# Patient Record
Sex: MALE | Race: WHITE | Employment: FULL TIME
[De-identification: names, ages, dates, MRNs, and addresses within clinical notes are randomized per-mention and may not be internally consistent; named-entity substitution may affect disease eponyms.]

---

## 2017-01-31 ENCOUNTER — OFFICE VISIT (OUTPATIENT)
Dept: BURN CARE | Facility: CLINIC | Age: 62
End: 2017-01-31

## 2017-01-31 VITALS
WEIGHT: 190 LBS | BODY MASS INDEX: 28.14 KG/M2 | TEMPERATURE: 98.8 F | SYSTOLIC BLOOD PRESSURE: 145 MMHG | DIASTOLIC BLOOD PRESSURE: 89 MMHG | HEART RATE: 92 BPM | HEIGHT: 69 IN

## 2017-01-31 DIAGNOSIS — T25.221A PARTIAL THICKNESS BURN OF RIGHT FOOT: Primary | ICD-10-CM

## 2017-01-31 PROCEDURE — G8484 FLU IMMUNIZE NO ADMIN: HCPCS | Performed by: PLASTIC SURGERY

## 2017-01-31 PROCEDURE — 16025 DRESS/DEBRID P-THICK BURN M: CPT | Performed by: PLASTIC SURGERY

## 2017-01-31 PROCEDURE — G8419 CALC BMI OUT NRM PARAM NOF/U: HCPCS | Performed by: PLASTIC SURGERY

## 2017-01-31 PROCEDURE — 99202 OFFICE O/P NEW SF 15 MIN: CPT | Performed by: PLASTIC SURGERY

## 2017-01-31 PROCEDURE — 3017F COLORECTAL CA SCREEN DOC REV: CPT | Performed by: PLASTIC SURGERY

## 2017-01-31 PROCEDURE — 1036F TOBACCO NON-USER: CPT | Performed by: PLASTIC SURGERY

## 2017-01-31 PROCEDURE — G8427 DOCREV CUR MEDS BY ELIG CLIN: HCPCS | Performed by: PLASTIC SURGERY

## 2017-01-31 RX ORDER — IBUPROFEN 200 MG
TABLET ORAL 2 TIMES DAILY
COMMUNITY
End: 2017-02-14 | Stop reason: ALTCHOICE

## 2017-02-14 ENCOUNTER — OFFICE VISIT (OUTPATIENT)
Dept: BURN CARE | Facility: CLINIC | Age: 62
End: 2017-02-14

## 2017-02-14 VITALS
WEIGHT: 192 LBS | HEART RATE: 80 BPM | TEMPERATURE: 98.4 F | HEIGHT: 68 IN | SYSTOLIC BLOOD PRESSURE: 137 MMHG | BODY MASS INDEX: 29.1 KG/M2 | DIASTOLIC BLOOD PRESSURE: 88 MMHG

## 2017-02-14 DIAGNOSIS — T25.221S BURN OF RIGHT FOOT, SECOND DEGREE, SEQUELA: Primary | ICD-10-CM

## 2017-02-14 PROBLEM — T25.021A BURN OF RIGHT FOOT: Status: ACTIVE | Noted: 2017-02-14

## 2017-02-14 PROCEDURE — 16025 DRESS/DEBRID P-THICK BURN M: CPT | Performed by: PLASTIC SURGERY

## 2017-02-14 PROCEDURE — G8419 CALC BMI OUT NRM PARAM NOF/U: HCPCS | Performed by: PLASTIC SURGERY

## 2017-02-14 PROCEDURE — 1036F TOBACCO NON-USER: CPT | Performed by: PLASTIC SURGERY

## 2017-02-14 PROCEDURE — 99213 OFFICE O/P EST LOW 20 MIN: CPT | Performed by: PLASTIC SURGERY

## 2017-02-14 PROCEDURE — G8427 DOCREV CUR MEDS BY ELIG CLIN: HCPCS | Performed by: PLASTIC SURGERY

## 2017-02-14 PROCEDURE — 3017F COLORECTAL CA SCREEN DOC REV: CPT | Performed by: PLASTIC SURGERY

## 2017-02-14 PROCEDURE — G8484 FLU IMMUNIZE NO ADMIN: HCPCS | Performed by: PLASTIC SURGERY

## 2017-02-14 RX ORDER — GINSENG 100 MG
CAPSULE ORAL 2 TIMES DAILY
COMMUNITY

## 2017-02-16 ENCOUNTER — TELEPHONE (OUTPATIENT)
Dept: BURN CARE | Facility: CLINIC | Age: 62
End: 2017-02-16

## 2017-03-07 ENCOUNTER — OFFICE VISIT (OUTPATIENT)
Dept: BURN CARE | Facility: CLINIC | Age: 62
End: 2017-03-07

## 2017-03-07 VITALS
BODY MASS INDEX: 29.83 KG/M2 | TEMPERATURE: 98.4 F | SYSTOLIC BLOOD PRESSURE: 143 MMHG | WEIGHT: 196.8 LBS | HEART RATE: 76 BPM | HEIGHT: 68 IN | DIASTOLIC BLOOD PRESSURE: 98 MMHG

## 2017-03-07 DIAGNOSIS — T25.221A PARTIAL THICKNESS BURN OF RIGHT FOOT: Primary | ICD-10-CM

## 2017-03-07 PROCEDURE — G8484 FLU IMMUNIZE NO ADMIN: HCPCS | Performed by: PLASTIC SURGERY

## 2017-03-07 PROCEDURE — 3017F COLORECTAL CA SCREEN DOC REV: CPT | Performed by: PLASTIC SURGERY

## 2017-03-07 PROCEDURE — G8419 CALC BMI OUT NRM PARAM NOF/U: HCPCS | Performed by: PLASTIC SURGERY

## 2017-03-07 PROCEDURE — 1036F TOBACCO NON-USER: CPT | Performed by: PLASTIC SURGERY

## 2017-03-07 PROCEDURE — 99212 OFFICE O/P EST SF 10 MIN: CPT | Performed by: PLASTIC SURGERY

## 2017-03-07 PROCEDURE — G8427 DOCREV CUR MEDS BY ELIG CLIN: HCPCS | Performed by: PLASTIC SURGERY

## 2024-08-06 RX ORDER — LANOLIN ALCOHOL/MO/W.PET/CERES
400 CREAM (GRAM) TOPICAL DAILY
COMMUNITY

## 2024-08-06 RX ORDER — ASPIRIN 81 MG/1
81 TABLET ORAL DAILY
COMMUNITY

## 2024-08-06 RX ORDER — AMLODIPINE BESYLATE 10 MG/1
10 TABLET ORAL DAILY
COMMUNITY

## 2024-08-06 RX ORDER — HYDRALAZINE HYDROCHLORIDE 100 MG/1
100 TABLET, FILM COATED ORAL 3 TIMES DAILY
COMMUNITY

## 2024-08-06 NOTE — PROGRESS NOTES
In preparation for their surgical procedure above patient was screened for Obstructive Sleep Apnea (RUDOLPH) using the STOP-Bang Questionnaire by the Pre-Admission Testing department.  This is a pre-surgical screening tool for patient safety and serves as a recommendation, this WILL NOT cause cancellation of surgery.    STOP-Bang Questionnaire  * Do you currently see a pulmonologist?  No     If yes STOP, do not complete.  Patient follows with Dr.     1.  Do you snore loudly (able to be heard in the next room)?      No    2.  Do you often feel tired or sleepy during the daytime?          No       3.  Has anyone ever told you that you stop breathing during your sleep?       No    4.  Do you have or are you being treated for high blood pressure?          Yes      5.  BMI more than 35?  BMI (Calculated): 30.5        No    6.  Age over 50 years? 69 y.o.      Yes    7.  Neck Circumference greater than 17 inches for male or 16 inches for female?  Measured           (visits only)            Not Applicable    8.  Gender Male?                 Yes      TOTAL SCORE: 3    RUDOLPH - Low Risk : Yes to 0 - 2 questions  RUDOLPH - Intermediate Risk : Yes to 3 - 4 questions  RUDOLPH - High Risk : Yes to 5 - 8 questions    Adapted from:   STOP Questionnaire: A Tool to Screen Patients for Obstructive Sleep Apnea   MERRILL Hardy.R.C.P.C., Milton Romano M.B.B.S., Harpal Alexander M.D., Millicent Snow, Ph.D., GERMAINE Penn.B.B.S., GERMAINE Baxter.Sc., Tracie Padilla M.D., Víctor Ricks F.R.C.P.C.   Anesthesiology 2008; 108:812-21 Copyright 2008, the American Society of Anesthesiologists, Inc. [employer]   ----------------------------------------------------------------------------------------------------------------

## 2024-08-06 NOTE — PROGRESS NOTES
NPO after midnight  Bring eye bag from office  Bring insurance info and drivers license  Wear comfortable clean clothing, button down top  Do not bring jewelry  Shower night before and morning of surgery with a liquid antibacterial soap  Bring list of medications with dosage and how often taken  Follow all instructions given by your physician   needed at discharge  Call [phone] for any questions

## 2024-08-09 NOTE — DISCHARGE INSTRUCTIONS
Cataract Post-Operative Care Instructions     How to Instill you Eye Drops:    Wash your hands before instilling drops   Shake eye drop bottle putting one drop in the surgical eye   The dropper tip should not touch the eyelashes or the eye.  Your head should be tilted back, look up and lower the eyelid pulled down from the cheekbone to form a pocket for the eye drop.       Daily Eye Drop Schedule:    Remove the eye patch as directed by the recovery room nurse.  Apply 1 drop of Ofloxacin three times today, then four times a day starting tomorrow for one week.  Lay a warm, clean and moist washcloth for 5 minutes on operative eye.   Apply 1 drop of Lotemax two times today, and then starting tomorrow three times a day for one week, two times a day for one week and then once a day for one week.  Lay a warm, clean and moist washcloth for 5 minutes on operative eye.    Apply 1 drop of Prolensa one time a day starting tomorrow for two weeks.  Lay a warm, clean and moist washcloth for 5 minutes on operative eye.      Care at Home:     Wear a shield at bedtime for one week following your eye surgery.   NEVER RUB YOUR OPERATIVE EYE!  Use of the operative eye is NOT harmful.   Your vision may be blurry with your present glasses.  Take your glasses to your follow-up appointment the day after surgery.  You will receive your new glasses prescription approximately 4-5 weeks following surgery.    You may do everything to care for yourself.   You may sleep on your back or either side after surgery, but NOT FACE DOWN ON YOUR STOMACH.    NO LIFTING OVER 10 POUNDS.   No outdoor work until your doctor tells you that it is OK.   Light work, including stooping over is not harmful.   If you have glaucoma, continue your normal use of the glaucoma drops.   Do not submerge head under water (Hot tub/swimming pool)    Please call office if any problem arise at [phone] [phone].    I have had the opportunity to ask questions and have

## 2024-08-12 ENCOUNTER — HOSPITAL ENCOUNTER (OUTPATIENT)
Age: 69
Setting detail: OUTPATIENT SURGERY
Discharge: HOME | End: 2024-08-12
Attending: OPHTHALMOLOGY | Admitting: OPHTHALMOLOGY
Payer: COMMERCIAL

## 2024-08-12 ENCOUNTER — ANESTHESIA (OUTPATIENT)
Dept: OPERATING ROOM | Age: 69
End: 2024-08-12
Payer: COMMERCIAL

## 2024-08-12 ENCOUNTER — ANESTHESIA EVENT (OUTPATIENT)
Dept: OPERATING ROOM | Age: 69
End: 2024-08-12
Payer: COMMERCIAL

## 2024-08-12 VITALS
OXYGEN SATURATION: 95 % | DIASTOLIC BLOOD PRESSURE: 73 MMHG | BODY MASS INDEX: 30.31 KG/M2 | SYSTOLIC BLOOD PRESSURE: 142 MMHG | HEIGHT: 68 IN | TEMPERATURE: 98 F | RESPIRATION RATE: 16 BRPM | WEIGHT: 200 LBS | HEART RATE: 65 BPM

## 2024-08-12 LAB — GLUCOSE BLD STRIP.AUTO-MCNC: 155 MG/DL (ref 70–108)

## 2024-08-12 PROCEDURE — 82948 REAGENT STRIP/BLOOD GLUCOSE: CPT

## 2024-08-12 PROCEDURE — 2500000003 HC RX 250 WO HCPCS: Performed by: OPHTHALMOLOGY

## 2024-08-12 PROCEDURE — 2580000003 HC RX 258: Performed by: OPHTHALMOLOGY

## 2024-08-12 PROCEDURE — 7100000011 HC PHASE II RECOVERY - ADDTL 15 MIN: Performed by: OPHTHALMOLOGY

## 2024-08-12 PROCEDURE — 3700000000 HC ANESTHESIA ATTENDED CARE: Performed by: OPHTHALMOLOGY

## 2024-08-12 PROCEDURE — 6370000000 HC RX 637 (ALT 250 FOR IP): Performed by: OPHTHALMOLOGY

## 2024-08-12 PROCEDURE — 2709999900 HC NON-CHARGEABLE SUPPLY: Performed by: OPHTHALMOLOGY

## 2024-08-12 PROCEDURE — 6360000002 HC RX W HCPCS: Performed by: OPHTHALMOLOGY

## 2024-08-12 PROCEDURE — 3700000001 HC ADD 15 MINUTES (ANESTHESIA): Performed by: OPHTHALMOLOGY

## 2024-08-12 PROCEDURE — V2632 POST CHMBR INTRAOCULAR LENS: HCPCS | Performed by: OPHTHALMOLOGY

## 2024-08-12 PROCEDURE — 7100000010 HC PHASE II RECOVERY - FIRST 15 MIN: Performed by: OPHTHALMOLOGY

## 2024-08-12 PROCEDURE — 3600000003 HC SURGERY LEVEL 3 BASE: Performed by: OPHTHALMOLOGY

## 2024-08-12 PROCEDURE — 3600000013 HC SURGERY LEVEL 3 ADDTL 15MIN: Performed by: OPHTHALMOLOGY

## 2024-08-12 PROCEDURE — 2720000010 HC SURG SUPPLY STERILE: Performed by: OPHTHALMOLOGY

## 2024-08-12 DEVICE — ACRYSOF(R) IQ ASPHERIC NATURAL IOL, SINGLE-PIECE ACRYLIC FOLDABLE PCL, UV WITH BLUE LIGHTFILTER, 13.0MM LENGTH, 6.0MM ANTERIORASYMMETRIC BICONVEX OPTIC, PLANAR HAPTICS.
Type: IMPLANTABLE DEVICE | Site: EYE | Status: FUNCTIONAL
Brand: ACRYSOF®

## 2024-08-12 RX ORDER — SODIUM CHLORIDE 9 MG/ML
INJECTION, SOLUTION INTRAVENOUS CONTINUOUS
Status: DISCONTINUED | OUTPATIENT
Start: 2024-08-12 | End: 2024-08-12 | Stop reason: HOSPADM

## 2024-08-12 RX ORDER — BUPIVACAINE HYDROCHLORIDE 7.5 MG/ML
1 INJECTION, SOLUTION EPIDURAL; RETROBULBAR EVERY 5 MIN PRN
Status: DISCONTINUED | OUTPATIENT
Start: 2024-08-12 | End: 2024-08-12 | Stop reason: HOSPADM

## 2024-08-12 RX ORDER — LIDOCAINE HYDROCHLORIDE 10 MG/ML
INJECTION, SOLUTION EPIDURAL; INFILTRATION; INTRACAUDAL; PERINEURAL PRN
Status: DISCONTINUED | OUTPATIENT
Start: 2024-08-12 | End: 2024-08-12 | Stop reason: ALTCHOICE

## 2024-08-12 RX ADMIN — Medication 1 DROP: at 10:45

## 2024-08-12 RX ADMIN — SODIUM CHLORIDE: 9 INJECTION, SOLUTION INTRAVENOUS at 11:05

## 2024-08-12 RX ADMIN — BUPIVACAINE HYDROCHLORIDE 0.38 MG: 7.5 INJECTION, SOLUTION EPIDURAL; RETROBULBAR at 10:50

## 2024-08-12 RX ADMIN — BUPIVACAINE HYDROCHLORIDE 0.38 MG: 7.5 INJECTION, SOLUTION EPIDURAL; RETROBULBAR at 10:45

## 2024-08-12 RX ADMIN — Medication 1 DROP: at 10:50

## 2024-08-12 ASSESSMENT — PAIN - FUNCTIONAL ASSESSMENT
PAIN_FUNCTIONAL_ASSESSMENT: NONE - DENIES PAIN
PAIN_FUNCTIONAL_ASSESSMENT: 0-10

## 2024-08-12 NOTE — OP NOTE
[facility]  RECORD OF OPERATION                       2024    Patient: Gaudencio Roberts  : 1955  Acct#: [id number]    PRE-OPERATIVE DIAGNOSIS:  Cataract, OD    POST-OPERATIVE DIAGNOSIS:  same    OPERATION PERFORMED:  Phacoemulsification cataract extraction with posterior chamber intraocular lens, OD    MODIFIERS: None    SURGEON:  Jun Rivera MD    ANESTHESIA:  Topical/MAC    COMPLICATIONS:  None    LENS INFORMATION:SN60WF +16.0 (SN:[id number])    CDE: 3.28    INDICATION AND CONSENT:  The patient was found to have a visually significant cataract affecting their activities of daily living which is not adequately correctable by a change in spectacles.  The risks and options of cataract surgery including observation were discussed.  Consent was obtained and the patient requests to proceed.    OPERATIVE TECHNIQUE: In the preoperative area, the patient was prepared for surgery including dilation of the operative eye.  The patient was then taken to the operating room, the operative eye was prepped and draped in the usual sterile ophthalmic fashion.  A final timeout was performed to confirm the correct patient, site, side, lens, and procedure.  A lid speculum was inserted.  A paracentesis incision was made at the limbus in a position comfortable for the non-dominate hand.  0.2-0.4cc of 1% lidocaine was instilled into the anterior chamber followed by 0.2-0.4cc of Omidria. Viscoelastic was instilled into the anterior chamber. A keratome was used to produce a clear corneal incision at the temporal limbus.  A capsulorrhexis-type capsulotomy was performed.  Hydrodissection was performed balanced salt solution (BSS).  The lens nucleus was phacoemulsified. The lens cortical material was aspirated using the automated irrigation/aspiration unit (I/A). Additional viscoelastic was instilled into the anterior segment and capsular bag. An intraocular lens was introduced and centered

## 2024-08-12 NOTE — PROGRESS NOTES
1118-Patient in Phase II. Patient is awake and denies any pain or nausea. Eye patch in place. Family at bedside. Vital signs stable    1122-snack and drink provided.    1130-IV removed at this time. Pt getting dressed    1140-discharge instructions provided to the patient and his wife. Voiced understanding.     1145-PT discharged home in stable condition. All belongings sent. Walked out with staff and home with family.

## 2024-08-12 NOTE — ANESTHESIA POSTPROCEDURE EVALUATION
Department of Anesthesiology  Postprocedure Note    Patient: Gaudencio Roberts  MRN: [id number]  YOB: 1955  Date of evaluation: 8/12/2024    Procedure Summary       Date: 08/12/24 Room / Location: [address]    Anesthesia Start: 1104 Anesthesia Stop: 1114    Procedure: Right Phacoemulsification with IOL (Right: Eye) Diagnosis:       Combined forms of age-related cataract of right eye      (Combined forms of age-related cataract of right eye [H25.811])    Surgeons: Jun Rivera MD Responsible Provider: Alberto Cox DO    Anesthesia Type: MAC ASA Status: 3            Anesthesia Type: No value filed.    Lucretia Phase I:      Lucretia Phase II: Lucretia Score: 9    Anesthesia Post Evaluation    Patient location during evaluation: bedside  Patient participation: complete - patient participated  Level of consciousness: awake and alert  Pain score: 0  Airway patency: patent  Nausea & Vomiting: no nausea and no vomiting  Cardiovascular status: hemodynamically stable and blood pressure returned to baseline  Respiratory status: spontaneous ventilation, room air and acceptable  Hydration status: stable  Pain management: adequate and satisfactory to patient    No notable events documented.

## 2024-08-12 NOTE — PROGRESS NOTES
1 DROP PROPARACAINE TO RIGHT EYE PRIOR TO PREP     CDE 3.28     1 DROP LOTEMAX AND 1 DROP MOXIFLOXACIN TO RIGHT EYE     0.1ML VIGAMOX TO RIGHT EYE     EYE SHIELD SECURED OVER RIGHT EYE WITH TAPE

## 2024-08-12 NOTE — H&P
I have examined the patient and reviewed the H&P / Consult and there are no changes to the patient.    Jun Rivera MD 8/12/202410:51 AM

## 2024-08-12 NOTE — ANESTHESIA PRE PROCEDURE
Department of Anesthesiology  Preprocedure Note       Name:  Gaudencio Roberts   Age:  69 y.o.  :  1955                                          MRN:  [id number]         Date:  2024      Surgeon: Surgeon(s):  Jun Rivera MD Deason, Erich K, APRN - NAHOMI    Procedure: Procedure(s):  Right Phacoemulsification with IOL    Medications prior to admission:   Prior to Admission medications    Medication Sig Start Date End Date Taking? Authorizing Provider   hydrALAZINE (APRESOLINE) 100 MG tablet Take 1 tablet by mouth 3 times daily   Yes Ilana Delaney MD   amLODIPine (NORVASC) 10 MG tablet Take 1 tablet by mouth daily   Yes ProviderIlana MD   folic acid (FOLVITE) 400 MCG tablet Take 1 tablet by mouth daily   Yes ProviderIlana MD   aspirin 81 MG EC tablet Take 1 tablet by mouth daily   Yes ProviderIlana MD   ZINC PO Take by mouth daily   Yes ProviderIlana MD   Cyanocobalamin (VITAMIN B-12 PO) Take by mouth daily   Yes ProviderIlana MD       Current medications:    Current Facility-Administered Medications   Medication Dose Route Frequency Provider Last Rate Last Admin   • BUPivacaine (PF) (MARCAINE) 0.75 % injection 0.375 mg  1 drop Ophthalmic Q5 Min PRN Jun Rivera MD   0.375 mg at 24 1045   • Tropic-Cyclopent-PE-Ketorolac 1-1-2.5-0.5 % SOLN 1 drop  1 drop Right Eye Q5 Min PRN Jun Rivera MD   1 drop at 24 1045   • 0.9 % sodium chloride infusion   IntraVENous Continuous Jun Rivera MD       • balanced salts (BSS) 500 mL    PRN Jun Rivera MD   225 mL at 24 1037   • lidocaine PF 1 % injection    PRN Jun Rivera MD   0.2 mL at 24 1037   • Phenylephrine-Ketorolac 1-0.3 % SOLN    PRN Jun Rivera MD   0.2 mL at 24 1038   • sod hyaluronate-sod chondroitin-sod hyaluronate (DUOVISC) intra-ocular kit    PRN Jun Rivera MD   1.05 mL at 24 1038       Allergies:  No Known Allergies    Problem List:    Patient Active

## 2024-08-19 NOTE — PROGRESS NOTES
NPO after midnight  Bring eye bag from office  Bring insurance info and drivers license  Wear comfortable clean clothing, button down top  Do not bring jewelry  Shower night before and morning of surgery with a liquid antibacterial soap  Bring list of medications with dosage and how often taken  Follow all instructions given by your physician   needed at discharge  Call [phone] for any questions

## 2024-08-22 NOTE — DISCHARGE INSTRUCTIONS
Cataract Post-Operative Care Instructions     How to Instill you Eye Drops:    Wash your hands before instilling drops   Shake eye drop bottle putting one drop in the surgical eye   The dropper tip should not touch the eyelashes or the eye.  Your head should be tilted back, look up and lower the eyelid pulled down from the cheekbone to form a pocket for the eye drop.       Daily Eye Drop Schedule:    Remove the eye patch as directed by the recovery room nurse.  Apply 1 drop of Ofloxacin three times today, then four times a day starting tomorrow for one week.  Lay a warm, clean and moist washcloth for 5 minutes on operative eye.   Apply 1 drop of Lotemax two times today, and then starting tomorrow three times a day for one week, two times a day for one week and then once a day for one week.  Lay a warm, clean and moist washcloth for 5 minutes on operative eye.    Apply 1 drop of Prolensa one time a day starting tomorrow for two weeks.  Lay a warm, clean and moist washcloth for 5 minutes on operative eye.      Care at Home:     Wear a shield at bedtime for one week following your eye surgery.   NEVER RUB YOUR OPERATIVE EYE!  Use of the operative eye is NOT harmful.   Your vision may be blurry with your present glasses.  Take your glasses to your follow-up appointment the day after surgery.  You will receive your new glasses prescription approximately 4-5 weeks following surgery.    You may do everything to care for yourself.   You may sleep on your back or either side after surgery, but NOT FACE DOWN ON YOUR STOMACH.    NO LIFTING OVER 10 POUNDS.   No outdoor work until your doctor tells you that it is OK.   Light work, including stooping over is not harmful.   If you have glaucoma, continue your normal use of the glaucoma drops.   Do not submerge head under water (Hot tub/swimming pool)    Please call office if any problem arise at [phone] [phone].    I have had the opportunity to ask questions and have

## 2024-08-26 ENCOUNTER — ANESTHESIA EVENT (OUTPATIENT)
Dept: OPERATING ROOM | Age: 69
End: 2024-08-26
Payer: COMMERCIAL

## 2024-08-26 ENCOUNTER — HOSPITAL ENCOUNTER (OUTPATIENT)
Age: 69
Setting detail: OUTPATIENT SURGERY
Discharge: HOME | End: 2024-08-26
Attending: OPHTHALMOLOGY | Admitting: OPHTHALMOLOGY
Payer: COMMERCIAL

## 2024-08-26 ENCOUNTER — ANESTHESIA (OUTPATIENT)
Dept: OPERATING ROOM | Age: 69
End: 2024-08-26
Payer: COMMERCIAL

## 2024-08-26 VITALS
TEMPERATURE: 96.5 F | RESPIRATION RATE: 16 BRPM | BODY MASS INDEX: 29.04 KG/M2 | HEART RATE: 65 BPM | DIASTOLIC BLOOD PRESSURE: 81 MMHG | WEIGHT: 191.6 LBS | HEIGHT: 68 IN | SYSTOLIC BLOOD PRESSURE: 172 MMHG | OXYGEN SATURATION: 97 %

## 2024-08-26 PROCEDURE — V2632 POST CHMBR INTRAOCULAR LENS: HCPCS | Performed by: OPHTHALMOLOGY

## 2024-08-26 PROCEDURE — 6360000002 HC RX W HCPCS: Performed by: OPHTHALMOLOGY

## 2024-08-26 PROCEDURE — 6370000000 HC RX 637 (ALT 250 FOR IP): Performed by: OPHTHALMOLOGY

## 2024-08-26 PROCEDURE — 7100000011 HC PHASE II RECOVERY - ADDTL 15 MIN: Performed by: OPHTHALMOLOGY

## 2024-08-26 PROCEDURE — 3600000013 HC SURGERY LEVEL 3 ADDTL 15MIN: Performed by: OPHTHALMOLOGY

## 2024-08-26 PROCEDURE — 6360000002 HC RX W HCPCS

## 2024-08-26 PROCEDURE — 2580000003 HC RX 258: Performed by: OPHTHALMOLOGY

## 2024-08-26 PROCEDURE — 7100000010 HC PHASE II RECOVERY - FIRST 15 MIN: Performed by: OPHTHALMOLOGY

## 2024-08-26 PROCEDURE — 3600000003 HC SURGERY LEVEL 3 BASE: Performed by: OPHTHALMOLOGY

## 2024-08-26 PROCEDURE — 2720000010 HC SURG SUPPLY STERILE: Performed by: OPHTHALMOLOGY

## 2024-08-26 PROCEDURE — 2500000003 HC RX 250 WO HCPCS: Performed by: OPHTHALMOLOGY

## 2024-08-26 PROCEDURE — 3700000000 HC ANESTHESIA ATTENDED CARE: Performed by: OPHTHALMOLOGY

## 2024-08-26 PROCEDURE — 3700000001 HC ADD 15 MINUTES (ANESTHESIA): Performed by: OPHTHALMOLOGY

## 2024-08-26 PROCEDURE — 2709999900 HC NON-CHARGEABLE SUPPLY: Performed by: OPHTHALMOLOGY

## 2024-08-26 DEVICE — ACRYSOF(R) IQ ASPHERIC NATURAL IOL, SINGLE-PIECE ACRYLIC FOLDABLE PCL, UV WITH BLUE LIGHTFILTER, 13.0MM LENGTH, 6.0MM ANTERIORASYMMETRIC BICONVEX OPTIC, PLANAR HAPTICS.
Type: IMPLANTABLE DEVICE | Site: EYE | Status: FUNCTIONAL
Brand: ACRYSOF®

## 2024-08-26 RX ORDER — FENTANYL CITRATE 50 UG/ML
INJECTION, SOLUTION INTRAMUSCULAR; INTRAVENOUS PRN
Status: DISCONTINUED | OUTPATIENT
Start: 2024-08-26 | End: 2024-08-26 | Stop reason: SDUPTHER

## 2024-08-26 RX ORDER — BUPIVACAINE HYDROCHLORIDE 7.5 MG/ML
1 INJECTION, SOLUTION EPIDURAL; RETROBULBAR EVERY 5 MIN PRN
Status: COMPLETED | OUTPATIENT
Start: 2024-08-26 | End: 2024-08-26

## 2024-08-26 RX ORDER — BALANCED SALT SOLUTION ENRICHED WITH BICARBONATE, DEXTROSE, AND GLUTATHIONE
KIT INTRAOCULAR PRN
Status: DISCONTINUED | OUTPATIENT
Start: 2024-08-26 | End: 2024-08-26 | Stop reason: ALTCHOICE

## 2024-08-26 RX ORDER — MIDAZOLAM HYDROCHLORIDE 1 MG/ML
INJECTION INTRAMUSCULAR; INTRAVENOUS PRN
Status: DISCONTINUED | OUTPATIENT
Start: 2024-08-26 | End: 2024-08-26 | Stop reason: SDUPTHER

## 2024-08-26 RX ORDER — LIDOCAINE HYDROCHLORIDE 10 MG/ML
INJECTION, SOLUTION EPIDURAL; INFILTRATION; INTRACAUDAL; PERINEURAL PRN
Status: DISCONTINUED | OUTPATIENT
Start: 2024-08-26 | End: 2024-08-26 | Stop reason: ALTCHOICE

## 2024-08-26 RX ORDER — SODIUM CHLORIDE 9 MG/ML
INJECTION, SOLUTION INTRAVENOUS CONTINUOUS
Status: DISCONTINUED | OUTPATIENT
Start: 2024-08-26 | End: 2024-08-26 | Stop reason: HOSPADM

## 2024-08-26 RX ADMIN — BUPIVACAINE HYDROCHLORIDE 0.38 MG: 7.5 INJECTION, SOLUTION EPIDURAL; RETROBULBAR at 09:46

## 2024-08-26 RX ADMIN — Medication 1 DROP: at 09:40

## 2024-08-26 RX ADMIN — SODIUM CHLORIDE: 9 INJECTION, SOLUTION INTRAVENOUS at 10:34

## 2024-08-26 RX ADMIN — Medication 1 DROP: at 09:50

## 2024-08-26 RX ADMIN — BUPIVACAINE HYDROCHLORIDE 0.38 MG: 7.5 INJECTION, SOLUTION EPIDURAL; RETROBULBAR at 09:40

## 2024-08-26 RX ADMIN — MIDAZOLAM 2 MG: 1 INJECTION INTRAMUSCULAR; INTRAVENOUS at 10:38

## 2024-08-26 RX ADMIN — FENTANYL CITRATE 25 MCG: 50 INJECTION, SOLUTION INTRAMUSCULAR; INTRAVENOUS at 10:38

## 2024-08-26 RX ADMIN — Medication 1 DROP: at 09:46

## 2024-08-26 RX ADMIN — BUPIVACAINE HYDROCHLORIDE 0.38 MG: 7.5 INJECTION, SOLUTION EPIDURAL; RETROBULBAR at 09:50

## 2024-08-26 ASSESSMENT — PAIN - FUNCTIONAL ASSESSMENT
PAIN_FUNCTIONAL_ASSESSMENT: NONE - DENIES PAIN
PAIN_FUNCTIONAL_ASSESSMENT: 0-10
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
## 2024-08-26 NOTE — OP NOTE
[facility]  RECORD OF OPERATION                       2024    Patient: Gaudencio Roberts  : 1955  Acct#: [id number]    PRE-OPERATIVE DIAGNOSIS:  Cataract, OS    POST-OPERATIVE DIAGNOSIS:  same    OPERATION PERFORMED:  Phacoemulsification cataract extraction with posterior chamber intraocular lens, OS    MODIFIERS: None    SURGEON:  Jun Rivera MD    ANESTHESIA:  Topical/MAC    COMPLICATIONS:  None    LENS INFORMATION:SN60WF +15.5 (SN:[id number])    CDE: 2.08    INDICATION AND CONSENT:  The patient was found to have a visually significant cataract affecting their activities of daily living which is not adequately correctable by a change in spectacles.  The risks and options of cataract surgery including observation were discussed.  Consent was obtained and the patient requests to proceed.    OPERATIVE TECHNIQUE: In the preoperative area, the patient was prepared for surgery including dilation of the operative eye.  The patient was then taken to the operating room, the operative eye was prepped and draped in the usual sterile ophthalmic fashion.  A final timeout was performed to confirm the correct patient, site, side, lens, and procedure.  A lid speculum was inserted.  A paracentesis incision was made at the limbus in a position comfortable for the non-dominate hand.  0.2-0.4cc of 1% lidocaine was instilled into the anterior chamber followed by 0.2-0.4cc of Omidria. Viscoelastic was instilled into the anterior chamber. A keratome was used to produce a clear corneal incision at the temporal limbus.  A capsulorrhexis-type capsulotomy was performed.  Hydrodissection was performed balanced salt solution (BSS).  The lens nucleus was phacoemulsified. The lens cortical material was aspirated using the automated irrigation/aspiration unit (I/A). Additional viscoelastic was instilled into the anterior segment and capsular bag. An intraocular lens was introduced and centered

## 2024-08-26 NOTE — H&P
I have examined the patient and reviewed the H&P / Consult and there are no changes to the patient.    Jun Rivera MD 8/26/20249:53 AM

## 2024-08-26 NOTE — PROGRESS NOTES
1 gtt Proparacaine given at start of case  1 gtt Lotemax given at end of case  1 gtt Moxifloxacin given at end of case  Vigamox 4:1 mixture W/ BSS at end of case 0.1mL given

## 2024-08-26 NOTE — ANESTHESIA POSTPROCEDURE EVALUATION
Department of Anesthesiology  Postprocedure Note    Patient: Gaudencio Roberts  MRN: [id number]  YOB: 1955  Date of evaluation: 8/26/2024    Procedure Summary       Date: 08/26/24 Room / Location: [address]    Anesthesia Start: 1034 Anesthesia Stop: 1049    Procedure: Left Phacoemulsification with IOL (Left) Diagnosis:       Combined forms of age-related cataract of left eye      (Combined forms of age-related cataract of left eye [H25.812])    Surgeons: Jun Rivera MD Responsible Provider: Tadeo Otoole MD    Anesthesia Type: MAC ASA Status: 3            Anesthesia Type: No value filed.    Lucretia Phase I:      Lucretia Phase II:      Anesthesia Post Evaluation    Patient location during evaluation: bedside  Patient participation: complete - patient participated  Level of consciousness: awake  Airway patency: patent  Nausea & Vomiting: no vomiting and no nausea  Cardiovascular status: hemodynamically stable  Respiratory status: acceptable  Hydration status: stable  Pain management: adequate    No notable events documented.

## 2024-08-26 NOTE — PROGRESS NOTES
1054- Patient to Phase II via chair. Report received from OR RN. Patient drowsy but responsive.Vitals obtained and stable. Respirations even and unlabored on room air. Patient denies pain, nausea, numbness and tingling. Patient able to move all extremities. Sx site appears clean and intact. Patient instructed to stay in chair. Instructed on call light use.     1058- Pt drinking fluids at this time    1109- Pt's IV removed at this time    1112- This RN went over discharge instructions w/ the pt and Millicent (wife). Both verbalized understanding and had all questions answered at this time.    1120- Pt getting changed at this time.    1126- Patient meets discharge criteria.  Discharged in stable condition with responsible . All belongings given to patient. Patient ambulated to car with assistance from RN. Patient tolerated well.

## (undated) DEVICE — Z INACTIVE USE 2735373 APPLICATOR FBR LAIN COT WOOD TIP ECONOMICAL

## (undated) DEVICE — MICROSURGICAL INSTRUMENT ANTERIOR CHAMBER CANNULA 27GA: Brand: ALCON

## (undated) DEVICE — CATARACT PACK: Brand: MEDLINE INDUSTRIES, INC.

## (undated) DEVICE — DRESSING TRNSPAR W2XL2.75IN FLM SHT SEMIPERMEABLE WIND

## (undated) DEVICE — EYE PAK* 1040 PLASTIC OPHTHALMIC DRAPE INCISE POUCH: Brand: ALCON EYE-PAK

## (undated) DEVICE — GLOVE SURG 7.5 11.7IN BEAD CUF LIGHT BRN SENSICARE LTX FREE

## (undated) DEVICE — MARKER,SKIN,WI/RULER AND LABELS: Brand: MEDLINE

## (undated) DEVICE — PHACOEMULSIFICATION PACK COMPACT

## (undated) DEVICE — CLEARCUT® SIDEPORT KNIFE DUAL BEVEL 1.0MM ANGLED: Brand: CLEARCUT®

## (undated) DEVICE — INTREPID® TRANSFORMER IA HP: Brand: INTREPID®

## (undated) DEVICE — CLEARCUT® HP2 SLIT KNIFE INTREPID MICRO-COAXIAL SYSTEM 2.4 DB: Brand: CLEARCUT®; INTREPID

## (undated) DEVICE — MICROSURGICAL INSTRUMENT HYDRODISSECTION CANNULA 25GA, 8MM BEND: Brand: ALCON

## (undated) DEVICE — SOLUTION BSS 15ML

## (undated) DEVICE — SYRINGE, LUER SLIP, STERILE, 1ML: Brand: MEDLINE

## (undated) DEVICE — BETADINE 5% EYE SOL